# Patient Record
Sex: FEMALE | Race: WHITE | URBAN - METROPOLITAN AREA
[De-identification: names, ages, dates, MRNs, and addresses within clinical notes are randomized per-mention and may not be internally consistent; named-entity substitution may affect disease eponyms.]

---

## 2017-03-14 ENCOUNTER — TELEPHONE (OUTPATIENT)
Dept: DERMATOLOGY | Age: 37
End: 2017-03-14

## 2017-03-14 RX ORDER — AZELAIC ACID 0.15 G/G
GEL TOPICAL
Qty: 50 G | Refills: 3 | Status: SHIPPED | OUTPATIENT
Start: 2017-03-14

## 2018-03-01 ENCOUNTER — OFFICE VISIT (OUTPATIENT)
Dept: DERMATOLOGY | Age: 38
End: 2018-03-01

## 2018-03-01 DIAGNOSIS — Z41.1 ELECTIVE PROCEDURE FOR UNACCEPTABLE COSMETIC APPEARANCE: ICD-10-CM

## 2018-03-01 DIAGNOSIS — L71.9 ROSACEA: Primary | ICD-10-CM

## 2018-03-01 PROCEDURE — DM01320 VBEAM LASER LARGE OR 4 AREAS/ FULL FACE ROSACEA: Performed by: DERMATOLOGY

## 2018-03-01 RX ORDER — CHORIONIC GONADOTROPIN 10000 UNIT
KIT INTRAMUSCULAR
COMMUNITY

## 2018-03-01 NOTE — PROGRESS NOTES
[facility]) Dermatology  Cally Syed MD  [phone]    Laser Procedure Note     Vincent Esters  1980    42 y.o. female     Date of Visit: 3/1/2018    LASER: Lupe Shen  DIAGNOSIS: Rosacea    Has had several IPL treatments previously at outside facility w/ marked improvement (last was several yrs ago). Treated once w/ [facility] in 2016 for redness. Since then, reports marked improvement of redness but feels that it has come back in the past few months. No issues after last treatment. Patient identified per protocol: yes  Location(s): Central forehead, medial cheeks, nose, chin  Verified and marked: yes  Techniques, risks, benefits and alternatives explained: yes  Consent sign, witnessed and dated: yes      Diagnosis, location, procedure reconfirmed: yes   Eye protection: yes  Anesthesia/pre-op meds: none    Laser settings:  (1)  WAVELENGTH: 595 LENS: 10mm FLUENCE: 7.75J (could not tolerate 8J) PULSE DURATION: 10ms COOLIN/20    Procedure note:  Single stack   (+) focal transient purpura    Post-operative care/disposition: Ice prn, elevate  Complications: none  Medications: Finacea gel bid   Wound care instructions provided: yes    Return if symptoms worsen or fail to improve.